# Patient Record
Sex: MALE | Race: WHITE | NOT HISPANIC OR LATINO | Employment: OTHER | URBAN - METROPOLITAN AREA
[De-identification: names, ages, dates, MRNs, and addresses within clinical notes are randomized per-mention and may not be internally consistent; named-entity substitution may affect disease eponyms.]

---

## 2023-11-28 ENCOUNTER — OFFICE VISIT (OUTPATIENT)
Dept: NEUROLOGY | Facility: HOSPITAL | Age: 68
End: 2023-11-28
Payer: MEDICARE

## 2023-11-28 VITALS
SYSTOLIC BLOOD PRESSURE: 138 MMHG | RESPIRATION RATE: 18 BRPM | DIASTOLIC BLOOD PRESSURE: 91 MMHG | TEMPERATURE: 97.1 F | WEIGHT: 231 LBS | HEIGHT: 71 IN | HEART RATE: 79 BPM | BODY MASS INDEX: 32.34 KG/M2

## 2023-11-28 DIAGNOSIS — G20.C PARKINSONISM, UNSPECIFIED PARKINSONISM TYPE (MULTI): Primary | ICD-10-CM

## 2023-11-28 DIAGNOSIS — R13.10 DYSPHAGIA, UNSPECIFIED TYPE: ICD-10-CM

## 2023-11-28 DIAGNOSIS — F48.2 PSEUDOBULBAR AFFECT: ICD-10-CM

## 2023-11-28 PROCEDURE — 1126F AMNT PAIN NOTED NONE PRSNT: CPT | Performed by: PSYCHIATRY & NEUROLOGY

## 2023-11-28 PROCEDURE — 1036F TOBACCO NON-USER: CPT | Performed by: PSYCHIATRY & NEUROLOGY

## 2023-11-28 PROCEDURE — 99205 OFFICE O/P NEW HI 60 MIN: CPT | Performed by: PSYCHIATRY & NEUROLOGY

## 2023-11-28 PROCEDURE — 99215 OFFICE O/P EST HI 40 MIN: CPT | Performed by: PSYCHIATRY & NEUROLOGY

## 2023-11-28 PROCEDURE — 1159F MED LIST DOCD IN RCRD: CPT | Performed by: PSYCHIATRY & NEUROLOGY

## 2023-11-28 PROCEDURE — 1160F RVW MEDS BY RX/DR IN RCRD: CPT | Performed by: PSYCHIATRY & NEUROLOGY

## 2023-11-28 RX ORDER — MODAFINIL 100 MG/1
100 TABLET ORAL DAILY
COMMUNITY
Start: 2023-11-02

## 2023-11-28 RX ORDER — MELATONIN 5 MG
CAPSULE ORAL
COMMUNITY
Start: 2021-07-02

## 2023-11-28 RX ORDER — CALCIUM CARB/VITAMIN D3/VIT K1 500-500-40
5000 TABLET,CHEWABLE ORAL
COMMUNITY

## 2023-11-28 RX ORDER — CARBIDOPA AND LEVODOPA 25; 100 MG/1; MG/1
2 TABLET ORAL 3 TIMES DAILY
Qty: 90 TABLET | Refills: 2 | Status: SHIPPED | OUTPATIENT
Start: 2023-11-28 | End: 2024-01-04 | Stop reason: SDUPTHER

## 2023-11-28 RX ORDER — VITAMIN B COMPLEX
CAPSULE ORAL
COMMUNITY

## 2023-11-28 RX ORDER — OMEGA-3-ACID ETHYL ESTERS 1 G/1
1 CAPSULE, LIQUID FILLED ORAL 2 TIMES DAILY
COMMUNITY
End: 2023-11-28 | Stop reason: ENTERED-IN-ERROR

## 2023-11-28 ASSESSMENT — UNIFIED PARKINSONS DISEASE RATING SCALE (UPDRS)
POSTURAL_TREMOR_LEFTHAND: 0
TOETAPPING_LEFT: 2
PRONATION_SUPINATION_LEFT: 2
RIGIDITY_LUE: 0
FINGER_TAPPING_RIGHT: 2
CONSTANCY_TREMOR_ATREST: 0
LEG_AGILITY_RIGHT: 2
RIGIDITY_RLE: 1
SPEECH: 3
TOETAPPING_RIGHT: 3
RIGIDITY_NECK: 0
FREEZING_GAIT: 1
LEVODOPA: NO
RIGIDITY_RUE: 1
SPONTANEITY_OF_MOVEMENT: 2
AMPLITUDE_RUE: 0
RIGIDITY_LLE: 0
POSTURAL_TREMOR_RIGHTHAND: 0
POSTURE: 1
LEG_AGILITY_LEFT: 1
PRONATION_SUPINATION_RIGHT: 2
FACIAL_EXPRESSION: 2
KINETIC_TREMOR_RIGHTHAND: 0
PARKINSONS_MEDS: NO
HANDMOVEMENTS_RIGHT: 2
POSTURAL_STABILITY: 3
AMPLITUDE_RLE: 0
AMPLITUDE_LUE: 0
KINETIC_TREMOR_LEFTHAND: 0
AMPLITUDE_LLE: 0
CHAIR_RISING_SCALE: 1
FINGER_TAPPING_LEFT: 2
GAIT: 2

## 2023-11-28 ASSESSMENT — MONTREAL COGNITIVE ASSESSMENT (MOCA)
5. MEMORY TRIALS: 0
WHAT IS THE TOTAL SCORE (OUT OF 30): 22
11. FOR EACH PAIR OF WORDS, WHAT CATEGORY DO THEY BELONG TO (OUT OF 2): 2
WHAT LEVEL OF EDUCATION WAS ATTAINED: 0
7. [VIGILENCE] TAP WHEN HEARING DESIGNATED LETTER: 0
9. REPEAT EACH SENTENCE: 2
VISUOSPATIAL/EXECUTIVE SUBSCORE: 3
8. SERIAL SUBTRACTION OF 7S: 3
12. MEMORY INDEX SCORE: 2
4. NAME EACH OF THE THREE ANIMALS SHOWN: 3
10. [FLUENCY] NAME WORDS STARTING WITH DESIGNATED LETTER: 0
6. READ LIST OF DIGITS [FORWARD/BACKWARD]: 2
13. ORIENTATION SUBSCORE: 5

## 2023-11-28 ASSESSMENT — ENCOUNTER SYMPTOMS
OCCASIONAL FEELINGS OF UNSTEADINESS: 1
DEPRESSION: 0
LOSS OF SENSATION IN FEET: 0

## 2023-11-28 ASSESSMENT — PAIN SCALES - GENERAL: PAINLEVEL: 0-NO PAIN
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
## 2023-11-28 NOTE — PATIENT INSTRUCTIONS
"It was nice to meet you today,   You have parkinsonism on your exam. In addition your eyes are moving a little slower in the vertical plane. I will keep an eye on this. The main differential here is Parkinson's disease as well as Progressive Supranuclear Palsy - also called PSP, however you have not tried any medications, so for now we are saying \"parkinsonism\".     Plan:  Start Carbidopa/levodopa 25/100 - start one tab three times a day, take 30 mins after breakfast, lunch and dinner. Watch for nausea, lightheadedness when standing, confusion and hallucinations as well as dyskinesias. Follow the instructions on the pill bottle to increase the dose gradually. If there are side effects back off.   Physical therapy for gait and balance  Barium swallow test and speech therapy.   RTC in 3 months   "

## 2024-01-04 ENCOUNTER — TELEPHONE (OUTPATIENT)
Dept: NEUROLOGY | Facility: CLINIC | Age: 69
End: 2024-01-04
Payer: MEDICARE

## 2024-01-04 DIAGNOSIS — G20.C PARKINSONISM, UNSPECIFIED PARKINSONISM TYPE (MULTI): ICD-10-CM

## 2024-01-04 RX ORDER — CARBIDOPA AND LEVODOPA 25; 100 MG/1; MG/1
2 TABLET ORAL 3 TIMES DAILY
Qty: 540 TABLET | Refills: 3 | Status: SHIPPED | OUTPATIENT
Start: 2024-01-04 | End: 2025-01-03

## 2024-01-04 NOTE — TELEPHONE ENCOUNTER
Patient spouse requesting a refill of Carbidopa-Levodopa  mg sent to [facility] pharmacy.

## 2024-03-07 ENCOUNTER — OFFICE VISIT (OUTPATIENT)
Dept: NEUROLOGY | Facility: HOSPITAL | Age: 69
End: 2024-03-07
Payer: MEDICARE

## 2024-03-07 VITALS
BODY MASS INDEX: 32.48 KG/M2 | WEIGHT: 232 LBS | HEIGHT: 71 IN | DIASTOLIC BLOOD PRESSURE: 80 MMHG | HEART RATE: 79 BPM | SYSTOLIC BLOOD PRESSURE: 117 MMHG

## 2024-03-07 DIAGNOSIS — G23.1 PSP (PROGRESSIVE SUPRANUCLEAR PALSY) (MULTI): Primary | ICD-10-CM

## 2024-03-07 DIAGNOSIS — R56.9 SEIZURE (MULTI): ICD-10-CM

## 2024-03-07 DIAGNOSIS — F48.2 PBA (PSEUDOBULBAR AFFECT): ICD-10-CM

## 2024-03-07 PROCEDURE — 99214 OFFICE O/P EST MOD 30 MIN: CPT | Mod: GC | Performed by: PSYCHIATRY & NEUROLOGY

## 2024-03-07 PROCEDURE — 99214 OFFICE O/P EST MOD 30 MIN: CPT | Performed by: PSYCHIATRY & NEUROLOGY

## 2024-03-07 PROCEDURE — 1036F TOBACCO NON-USER: CPT | Performed by: PSYCHIATRY & NEUROLOGY

## 2024-03-07 PROCEDURE — 1126F AMNT PAIN NOTED NONE PRSNT: CPT | Performed by: PSYCHIATRY & NEUROLOGY

## 2024-03-07 PROCEDURE — 1160F RVW MEDS BY RX/DR IN RCRD: CPT | Performed by: PSYCHIATRY & NEUROLOGY

## 2024-03-07 PROCEDURE — 1159F MED LIST DOCD IN RCRD: CPT | Performed by: PSYCHIATRY & NEUROLOGY

## 2024-03-07 RX ORDER — CITALOPRAM 20 MG/1
TABLET, FILM COATED ORAL
Qty: 90 TABLET | Refills: 3 | Status: SHIPPED | OUTPATIENT
Start: 2024-03-07

## 2024-03-07 NOTE — PATIENT INSTRUCTIONS
It was pleasure to visit you.  You can taper off carbidopa levodopa  Take one tablet three times a day for a week then discontinue it  For two episodes of staring we ordered EEG.  We put a referral for speech therapy
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
## 2024-03-14 ENCOUNTER — APPOINTMENT (OUTPATIENT)
Dept: NEUROLOGY | Facility: HOSPITAL | Age: 69
End: 2024-03-14
Payer: MEDICARE

## 2024-04-01 ENCOUNTER — TELEPHONE (OUTPATIENT)
Dept: NEUROLOGY | Facility: CLINIC | Age: 69
End: 2024-04-01
Payer: MEDICARE

## 2024-04-01 NOTE — TELEPHONE ENCOUNTER
Speech Therapy called from [facility] stating that order has to have diagnosis of Dysphagia on it for Insurance purposes to pay not just Parkinson's (R13.12 IS CODE)

## 2024-04-02 DIAGNOSIS — G20.A1 PARKINSON'S DISEASE WITHOUT DYSKINESIA OR FLUCTUATING MANIFESTATIONS (MULTI): Primary | ICD-10-CM

## 2024-04-10 ENCOUNTER — TELEPHONE (OUTPATIENT)
Dept: NEUROLOGY | Facility: CLINIC | Age: 69
End: 2024-04-10
Payer: MEDICARE

## 2024-04-10 NOTE — TELEPHONE ENCOUNTER
Wife called stated patient has gotten worse since the discontinued Carbidopa/Levadopa ... Patient fell and broke wrist wife would like to know can he restart medication

## 2024-04-11 DIAGNOSIS — R13.10 DYSPHAGIA, UNSPECIFIED TYPE: ICD-10-CM

## 2024-04-11 DIAGNOSIS — G20.C PARKINSONISM, UNSPECIFIED PARKINSONISM TYPE (MULTI): Primary | ICD-10-CM

## 2024-04-11 NOTE — TELEPHONE ENCOUNTER
Spoke to the spouse and passed along information from Dr Von macedo to restart C/L. Discussed instructions, dose times, constipation, diarrhea and avoiding protein at dose times. Pt having wrist surgery next week.

## 2024-06-10 ENCOUNTER — TELEPHONE (OUTPATIENT)
Dept: NEUROLOGY | Facility: CLINIC | Age: 69
End: 2024-06-10
Payer: MEDICARE

## 2024-06-10 NOTE — TELEPHONE ENCOUNTER
Looks like Dr. Connelly ordered EEG at last visit in March, I do not see it was done? Please have them complete before follow up. Thanks!

## 2024-06-10 NOTE — TELEPHONE ENCOUNTER
"I spoke to the spouse. Pt \"zones out\" on occasion and seems near to LOC. Spouse is retired RN - does not think its a seizure. He remembers the entire event which does not last long and complains of feeling suddenly dizzy. Bps typically 120s/80s but she does not take them often. Advised to start a BP log with orthostatics in Morning and Evening. Especially right after these episodes. Advised to go to ED for any LOC or fall where he hits his head. He has fallen 3 times since broken wrist w plate repair in early April. He his scheduled for plate removal in early July. No other meds changes; has been to PCP at [facility] and recent labs are all good. She will complete BP log and advised her to upload it to us in EPIC. He has had no other recent med changes - his thinking is clear and no changes with ambulation or movements. He goes to PT twice weekly for wrist. He is scheduled to see BELINDA Smith in July. Declines NP visit livan Hayes at this time. They will do BP log and call back to schedule livan Hayes if they feel he needs to be seen sooner  "

## 2024-06-10 NOTE — TELEPHONE ENCOUNTER
"Ruth (wife & RN) called. Aleksandar had a significant fall requiring wrist surgery to be done at [facility]. Since then he has had a few falls. \"Spells where he zones out & feels dizzy\" . She encourages fluid intake but still problematic. She is wondering if there is anything that can be done about these spells. She took his BP once and was found to be a little low. Please call her at [phone]  "

## 2024-06-12 ENCOUNTER — HOSPITAL ENCOUNTER (OUTPATIENT)
Dept: NEUROLOGY | Facility: HOSPITAL | Age: 69
Discharge: HOME | End: 2024-06-12
Payer: MEDICARE

## 2024-06-12 DIAGNOSIS — R56.9 SEIZURE (MULTI): ICD-10-CM

## 2024-06-12 PROCEDURE — 95816 EEG AWAKE AND DROWSY: CPT

## 2024-06-17 ENCOUNTER — TELEPHONE (OUTPATIENT)
Dept: NEUROLOGY | Facility: CLINIC | Age: 69
End: 2024-06-17
Payer: MEDICARE

## 2024-06-17 NOTE — TELEPHONE ENCOUNTER
----- Message from Sonia Connelly MD sent at 6/14/2024  2:15 PM EDT -----  Plz let pt know EEG was normal,no evidence of epileptic activity

## 2024-06-17 NOTE — TELEPHONE ENCOUNTER
Called Back- No Answer, Left detailed message passing along the information from Dr Connelly regarding EEG was normal.

## 2024-07-23 ENCOUNTER — OFFICE VISIT (OUTPATIENT)
Dept: NEUROLOGY | Facility: HOSPITAL | Age: 69
End: 2024-07-23
Payer: MEDICARE

## 2024-07-23 VITALS
HEART RATE: 75 BPM | SYSTOLIC BLOOD PRESSURE: 114 MMHG | DIASTOLIC BLOOD PRESSURE: 76 MMHG | WEIGHT: 232.5 LBS | BODY MASS INDEX: 32.55 KG/M2 | HEIGHT: 71 IN | TEMPERATURE: 97.2 F | RESPIRATION RATE: 18 BRPM

## 2024-07-23 DIAGNOSIS — F48.2 PSEUDOBULBAR AFFECT: ICD-10-CM

## 2024-07-23 DIAGNOSIS — R55 SYNCOPE, UNSPECIFIED SYNCOPE TYPE: ICD-10-CM

## 2024-07-23 DIAGNOSIS — G20.C PARKINSONISM, UNSPECIFIED PARKINSONISM TYPE (MULTI): Primary | ICD-10-CM

## 2024-07-23 PROCEDURE — 1160F RVW MEDS BY RX/DR IN RCRD: CPT | Performed by: PSYCHIATRY & NEUROLOGY

## 2024-07-23 PROCEDURE — 99214 OFFICE O/P EST MOD 30 MIN: CPT | Performed by: PSYCHIATRY & NEUROLOGY

## 2024-07-23 PROCEDURE — 1126F AMNT PAIN NOTED NONE PRSNT: CPT | Performed by: PSYCHIATRY & NEUROLOGY

## 2024-07-23 PROCEDURE — 1159F MED LIST DOCD IN RCRD: CPT | Performed by: PSYCHIATRY & NEUROLOGY

## 2024-07-23 PROCEDURE — 99214 OFFICE O/P EST MOD 30 MIN: CPT | Mod: GC | Performed by: PSYCHIATRY & NEUROLOGY

## 2024-07-23 PROCEDURE — 1036F TOBACCO NON-USER: CPT | Performed by: PSYCHIATRY & NEUROLOGY

## 2024-07-23 PROCEDURE — 3008F BODY MASS INDEX DOCD: CPT | Performed by: PSYCHIATRY & NEUROLOGY

## 2024-07-23 ASSESSMENT — UNIFIED PARKINSONS DISEASE RATING SCALE (UPDRS)
HOEHN_YAHR: 4
RIGIDITY_RUE: 0
KINETIC_TREMOR_LEFTHAND: 0
AMPLITUDE_LUE: 0
CLINICAL_STATE: ON
PARKINSONS_MEDS: YES
DYSKINESIAS_PRESENT: NO
RIGIDITY_LLE: 0
RIGIDITY_NECK: 0
PRONATION_SUPINATION_LEFT: 1
PRONATION_SUPINATION_RIGHT: 1
LEG_AGILITY_LEFT: 1
CHAIR_RISING_SCALE: 4
CONSTANCY_TREMOR_ATREST: 0
POSTURAL_TREMOR_RIGHTHAND: 0
FINGER_TAPPING_LEFT: 3
SPONTANEITY_OF_MOVEMENT: 1
SPEECH: 0
KINETIC_TREMOR_RIGHTHAND: 0
AMPLITUDE_LIP_JAW: 0
AMPLITUDE_RUE: 0
LEVODOPA: YES
FACIAL_EXPRESSION: 4
POSTURAL_TREMOR_LEFTHAND: 0
FREEZING_GAIT: 1
RIGIDITY_RLE: 0
RIGIDITY_LUE: 0
LEG_AGILITY_RIGHT: 1
HANDMOVEMENTS_RIGHT: 2
GAIT: 3
TOETAPPING_LEFT: 1
AMPLITUDE_LLE: 0
FINGER_TAPPING_RIGHT: 2
TOETAPPING_RIGHT: 1
POSTURE: 1
AMPLITUDE_RLE: 0

## 2024-07-23 ASSESSMENT — PAIN SCALES - GENERAL: PAINLEVEL: 0-NO PAIN
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
## 2024-11-26 ENCOUNTER — OFFICE VISIT (OUTPATIENT)
Dept: NEUROLOGY | Facility: HOSPITAL | Age: 69
End: 2024-11-26
Payer: MEDICARE

## 2024-11-26 VITALS
HEART RATE: 80 BPM | HEIGHT: 71 IN | DIASTOLIC BLOOD PRESSURE: 76 MMHG | BODY MASS INDEX: 32.43 KG/M2 | SYSTOLIC BLOOD PRESSURE: 116 MMHG

## 2024-11-26 DIAGNOSIS — F48.2 PBA (PSEUDOBULBAR AFFECT): Primary | ICD-10-CM

## 2024-11-26 DIAGNOSIS — G23.1 PSP (PROGRESSIVE SUPRANUCLEAR PALSY) (MULTI): ICD-10-CM

## 2024-11-26 DIAGNOSIS — G20.C PARKINSONISM, UNSPECIFIED PARKINSONISM TYPE (MULTI): ICD-10-CM

## 2024-11-26 DIAGNOSIS — R13.10 DYSPHAGIA, UNSPECIFIED TYPE: ICD-10-CM

## 2024-11-26 PROCEDURE — 99214 OFFICE O/P EST MOD 30 MIN: CPT | Performed by: PSYCHIATRY & NEUROLOGY

## 2024-11-26 PROCEDURE — 1036F TOBACCO NON-USER: CPT | Performed by: PSYCHIATRY & NEUROLOGY

## 2024-11-26 PROCEDURE — G2211 COMPLEX E/M VISIT ADD ON: HCPCS | Performed by: PSYCHIATRY & NEUROLOGY

## 2024-11-26 PROCEDURE — 1159F MED LIST DOCD IN RCRD: CPT | Performed by: PSYCHIATRY & NEUROLOGY

## 2024-11-26 PROCEDURE — 1160F RVW MEDS BY RX/DR IN RCRD: CPT | Performed by: PSYCHIATRY & NEUROLOGY

## 2024-11-26 RX ORDER — DEXTROMETHORPHAN HYDROBROMIDE AND QUINIDINE SULFATE 20; 10 MG/1; MG/1
1 CAPSULE, GELATIN COATED ORAL 2 TIMES DAILY
Qty: 60 CAPSULE | Refills: 11 | Status: SHIPPED | OUTPATIENT
Start: 2024-11-26 | End: 2025-11-26

## 2024-11-26 ASSESSMENT — UNIFIED PARKINSONS DISEASE RATING SCALE (UPDRS)
FREEZING_GAIT: 0
TOETAPPING_RIGHT: 1
FACIAL_EXPRESSION: 3
FINGER_TAPPING_RIGHT: 1
TOTAL_SCORE: 36
POSTURAL_TREMOR_LEFTHAND: 0
PRONATION_SUPINATION_RIGHT: 1
POSTURAL_TREMOR_RIGHTHAND: 0
KINETIC_TREMOR_RIGHTHAND: 0
DYSKINESIAS_PRESENT: NO
TOETAPPING_LEFT: 2
RIGIDITY_RUE: 0
AMPLITUDE_LUE: 0
FINGER_TAPPING_LEFT: 2
CLINICAL_STATE: ON
HANDMOVEMENTS_RIGHT: 2
RIGIDITY_RLE: 2
LEVODOPA: YES
AMPLITUDE_LLE: 0
SPEECH: 2
PRONATION_SUPINATION_LEFT: 2
GAIT: 2
LEG_AGILITY_RIGHT: 1
RIGIDITY_LUE: 1
RIGIDITY_LLE: 2
SPONTANEITY_OF_MOVEMENT: 1
PARKINSONS_MEDS: YES
AMPLITUDE_LIP_JAW: 0
LEG_AGILITY_LEFT: 1
KINETIC_TREMOR_LEFTHAND: 0
CHAIR_RISING_SCALE: 3
POSTURE: 1
AMPLITUDE_RLE: 0
HOEHN_YAHR: 4
AMPLITUDE_RUE: 0
POSTURAL_STABILITY: 2
CONSTANCY_TREMOR_ATREST: 0
RIGIDITY_NECK: 1
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
## 2024-11-26 NOTE — PATIENT INSTRUCTIONS
It was nice to see you today     -Continue Sinemet 25/100 2 tablets 3 times a day. (Symptoms worsened when tapered off, so at least some mild levodopa responsiveness.   - continue citalopram   - let;s try Nuedexta for the pseudobulbar affect.   Let us know if this is expensive  - RTC in 4-5 months.  - order for PT, OT and ST

## 2025-01-16 ENCOUNTER — TELEPHONE (OUTPATIENT)
Dept: NEUROLOGY | Facility: CLINIC | Age: 70
End: 2025-01-16
Payer: MEDICARE

## 2025-01-16 NOTE — TELEPHONE ENCOUNTER
Ruth ( wife/ POA) called. Aleksandar has had 2-3 spells while seated. They spell lasts a few seconds. He flapps his hands and zones out .  She's not sure what to do with this situation or if it's relevant to his PSP or able to be treated. Please call  [phone]

## 2025-01-17 ENCOUNTER — TELEPHONE (OUTPATIENT)
Dept: NEUROLOGY | Facility: HOSPITAL | Age: 70
End: 2025-01-17
Payer: MEDICARE

## 2025-01-17 NOTE — TELEPHONE ENCOUNTER
Spoke with wife who reports 2 episodes of unusual behavior (glazed eyes, flapping hands lasting less than 20 seconds with no confusion after). Patient responsive to his name right after episode. One episode on Sunday 12th and another today. Wife concerned that it might be a seizure but reports previous episodes of zoning out for which he had EEG that was unrevealing.    Recommended that patient to go to ED if he gets increased frequency of these events or if he gets whole body shaking episodes

## 2025-02-03 ENCOUNTER — TELEPHONE (OUTPATIENT)
Dept: NEUROLOGY | Facility: CLINIC | Age: 70
End: 2025-02-03
Payer: MEDICARE

## 2025-02-03 DIAGNOSIS — G20.C PARKINSONISM, UNSPECIFIED PARKINSONISM TYPE (MULTI): ICD-10-CM

## 2025-02-03 RX ORDER — CARBIDOPA AND LEVODOPA 25; 100 MG/1; MG/1
2 TABLET ORAL 3 TIMES DAILY
Qty: 540 TABLET | Refills: 3 | Status: SHIPPED | OUTPATIENT
Start: 2025-02-03 | End: 2026-02-03

## 2025-02-03 NOTE — TELEPHONE ENCOUNTER
Sharon called this morning. Aleksandar has had 3 spells since 1/17/25 when you last spoke with her. She remains concerned about treatment for these spells. She also mentioned that he is urinating more frequently and bathroom trips are longer than usual. I reviewed the last phone call with her and advised a trip to the ER. They would be able to test for acute illness/ UTI ect. She was inquiring about an MRI/ medication to help with the spells. I again advised her to take him to ER for this type of spell. She also make an appt with Camilo Hayes CNP for 3/18 ( her preferred time/ date/ location. I did let her know that if she didn't want the ER visit she should at least reach out to PCP for UA/ Labs to rule out infection/ acute illness. Please call back to reaffirm our previous instructions.

## 2025-02-04 NOTE — PROGRESS NOTES
Spoke to patient regarding increased frequency of zoning out episodes lasting less than 30 seconds. Wife wanted patient to be seen sooner at outpatient office. Explained that our outpatient movement disorder clinic does not have the tools needed to evaluate patient's current episodes. Wife indicated that that she would take him to the ER if episodes were to continue to happen.    Refill for Sinemet was provided.

## 2025-03-13 ENCOUNTER — OFFICE VISIT (OUTPATIENT)
Dept: NEUROLOGY | Facility: HOSPITAL | Age: 70
End: 2025-03-13
Payer: MEDICARE

## 2025-03-13 VITALS
TEMPERATURE: 96.9 F | SYSTOLIC BLOOD PRESSURE: 94 MMHG | HEART RATE: 77 BPM | HEIGHT: 71 IN | RESPIRATION RATE: 18 BRPM | WEIGHT: 237 LBS | DIASTOLIC BLOOD PRESSURE: 56 MMHG | BODY MASS INDEX: 33.18 KG/M2

## 2025-03-13 DIAGNOSIS — G23.1 PSP (PROGRESSIVE SUPRANUCLEAR PALSY) (MULTI): Primary | ICD-10-CM

## 2025-03-13 DIAGNOSIS — F48.2 PSEUDOBULBAR AFFECT: ICD-10-CM

## 2025-03-13 DIAGNOSIS — R53.82 CHRONIC FATIGUE: ICD-10-CM

## 2025-03-13 DIAGNOSIS — R40.4 EPISODE OF UNRESPONSIVENESS: ICD-10-CM

## 2025-03-13 PROCEDURE — 1036F TOBACCO NON-USER: CPT | Performed by: NURSE PRACTITIONER

## 2025-03-13 PROCEDURE — 1126F AMNT PAIN NOTED NONE PRSNT: CPT | Performed by: NURSE PRACTITIONER

## 2025-03-13 PROCEDURE — 99215 OFFICE O/P EST HI 40 MIN: CPT | Performed by: NURSE PRACTITIONER

## 2025-03-13 PROCEDURE — 1159F MED LIST DOCD IN RCRD: CPT | Performed by: NURSE PRACTITIONER

## 2025-03-13 PROCEDURE — 1160F RVW MEDS BY RX/DR IN RCRD: CPT | Performed by: NURSE PRACTITIONER

## 2025-03-13 PROCEDURE — 3008F BODY MASS INDEX DOCD: CPT | Performed by: NURSE PRACTITIONER

## 2025-03-13 RX ORDER — CARBIDOPA AND LEVODOPA 25; 100 MG/1; MG/1
2 TABLET ORAL 4 TIMES DAILY
Qty: 720 TABLET | Refills: 3 | Status: SHIPPED | OUTPATIENT
Start: 2025-03-13 | End: 2026-03-13

## 2025-03-13 ASSESSMENT — UNIFIED PARKINSONS DISEASE RATING SCALE (UPDRS)
AMPLITUDE_LIP_JAW: 0
LEG_AGILITY_LEFT: 3
POSTURE: 2
KINETIC_TREMOR_LEFTHAND: 0
CHAIR_RISING_SCALE: 2
RIGIDITY_RLE: 0
RIGIDITY_LUE: 0
CLINICAL_STATE: ON
RIGIDITY_NECK: 0
RIGIDITY_LLE: 0
TOETAPPING_LEFT: 2
PARKINSONS_MEDS: YES
PRONATION_SUPINATION_LEFT: 3
TOETAPPING_RIGHT: 2
GAIT: 3
FINGER_TAPPING_RIGHT: 1
AMPLITUDE_LUE: 0
SPEECH: 2
FINGER_TAPPING_LEFT: 2
PRONATION_SUPINATION_RIGHT: 1
FREEZING_GAIT: 0
AMPLITUDE_LLE: 0
FACIAL_EXPRESSION: 2
AMPLITUDE_RLE: 0
RIGIDITY_RUE: 1
KINETIC_TREMOR_RIGHTHAND: 0
SPONTANEITY_OF_MOVEMENT: 3
LEVODOPA: YES
CONSTANCY_TREMOR_ATREST: 0
LEG_AGILITY_RIGHT: 3
POSTURAL_STABILITY: 0
POSTURAL_TREMOR_RIGHTHAND: 0
AMPLITUDE_RUE: 0
HANDMOVEMENTS_RIGHT: 2
TOTAL_SCORE: 37
POSTURAL_TREMOR_LEFTHAND: 0

## 2025-03-13 ASSESSMENT — ENCOUNTER SYMPTOMS
OCCASIONAL FEELINGS OF UNSTEADINESS: 1
DEPRESSION: 0
LOSS OF SENSATION IN FEET: 0

## 2025-03-13 ASSESSMENT — PAIN SCALES - GENERAL: PAINLEVEL_OUTOF10: 0-NO PAIN

## 2025-03-13 NOTE — PATIENT INSTRUCTIONS
#MRI brain--schedule 2 weeks out, obtain if no improvement in spells w/ increase in meds    Please call radiology to schedule  [phone]    #Increase Carbidopa-levodopa 25/100mg to 2 tabs 4 times a day every 4 hrs [id number]    Side effects include but are not limited to: sleepiness, nausea, constipation, dizziness, hallucinations, or involuntary wiggling movements. If you experience any side effects please call our clinic.    #Labs ordered since you cannot get in with your PCP    #Let me know if blood pressure, because we will monitor BPs again    #Agree with restarting ST    Let me know if ST wants to repeat  modified barium swallow study to assess swallowing--I offered to order now, goal is to prevent choking/aspiration pneumonia.     #Keep apt as scheduled with Dr. Tory Hayes, NP-C  Adult/Gerontological Nurse Practitioner   [facility], Department of Neurology  [facility] [place]  [facility]  [address] [place] [address]  Phone: [phone]  Fax: [phone]

## 2025-03-13 NOTE — PROGRESS NOTES
"Subjective     Josh Lockwood is a 69 y.o. year old male who presents with No chief complaint on file., here for follow up visit.    HPI  Last visit 11/26/24 with Dr. Connelly for \"parkinsonism and slow vertical saccades with pseudobulbar affect has been suspected for progressive supranuclear palsy\"  Plan:  -Continue Sinemet 25/100 2 tablets 3 times a day. (Symptoms worsened when tapered off, so at least some mild levodopa responsiveness.   - try Nuedexta for PBA  - OT, ST, PT  - RTC in 4 months.     Comes w/ wife and daughter.     Nudexta for inappropriate laughing. Does not have as many episodes of crying anymore. This is too expensive at $800/M.     Started calling in Jan for short reoccurring episodes \"zones out\", first time had movement of hands (only time flailing his hands slightly), rolls eyes up but 20-30sec, wife feels she can get him out of it. Not starting and not responding, eyes looking up.   If he gets into a laughing jag he can have one but usually when watching TV.   2 a week since Jan 12th.   When he comes out of the spells he is alert and with it. He tells her he had a spell when she is not there, once felt dizzy but not usually  He had an EEG in June 2024 (ordered 3/2024) for having 1-2 of these spells prior to then but are increasing.   Occurs toward the end of the or when due for a dose of Sinemet wife notes  Not confused coming out of it  He cannot hear her but when he comes out of it he can hear wife yelling.     Fatigued, can sleep 16hrs/day.     Laughing episodes are severe, like a \"jag\" where he cannot breath because he is laughing so hard, not as often during the day but worse as the day goes on, worse at hs.       MOTOR SYMPTOMS      +/-                            Comments  Motor sx overall     Tremor -    Rigidity -    Bradykinesia +    Balance/gait + Cane or walker at home    Hx hip frx 2022 has impacted gait since   FOG + More noticible and trips him up   Falls + 1-2x/month not bad "   PT + [facility] ending now   Exercise +      NON MOTOR SYMPTOMS       +/-                               Comments  Orthostatic lightheadedness  + HHC did OH BPs and were normal though fluctuates, low BP and high BP and frequently checked sitting to standing    Wife forces fluids and not enough   Cognitive + Wife denies concerns, feels rarely is he forgetful    Hx: Changes noted in 2021   Insomnia -    RBD -    Depression - Citalopram   Anxiety -    Sialorrhea -    Hypophonia +    Dysphagia + Worse, has seen ST and did well w/ techniques but now worsened with liquids--she has req to do ST again   Constipation -    Urinary dysfunction + Takes a long time to urinate but he feels wife is too close to the bathroom        Social  Lives with: wife  Help with ADLs: complete assistance         [facility] Meds  Med Dose Time   Carbidopa-levodopa 25/100mg   2 tabs 3 times a day 8-3-10                   Latency     Wearing off Close to dose wife notices he is more difficult to manage with laughing    Side effects     Dyskinesia     Hallucinations     Impulse control     Sudden sleep     Other       Prior medications:    Pertinent testing:  EEG 6/13/24: normal routine EEG without epileptiform discharges or lateralizing signs.    MOCA 22/30 ,on 11/2023.                   Current Outpatient Medications:     b complex vitamins capsule, Take by mouth., Disp: , Rfl:     carbidopa-levodopa (Sinemet)  mg tablet, Take 2 tablets by mouth 4 times a day., Disp: 720 tablet, Rfl: 3    cholecalciferol (Vitamin D-3) 400 unit capsule, Take 5,000 Units by mouth., Disp: , Rfl:     citalopram (CeleXA) 20 mg tablet, Take half tablet for one week then take one tablet daily in the morning, Disp: 90 tablet, Rfl: 3    dextromethorphan-quinidine (Nuedexta) 20-10 mg capsule, Take 1 capsule by mouth 2 times a day. Week 1: take one tab a day, from week 2 onwards: one tab twice a day., Disp: 60 capsule, Rfl: 11    melatonin 5 mg capsule, Take  "by mouth., Disp: , Rfl:     modafinil (Provigil) 100 mg tablet, Take 1 tablet (100 mg) by mouth once daily., Disp: , Rfl:        Objective   Vitals:    03/13/25 1048   BP: 94/56   Pulse: 77   Resp: 18   Temp: 36.1 °C (96.9 °F)   Weight: 108 kg (237 lb)   Height: 1.803 m (5' 11\")                 Physical Exam    General:  Well developed well-nourished male in no acute distress with good grooming.  Mental Status:  The patient is awake, alert, slow to respond. Attention span and concentration is within normal limits. Some laughing but not excessive or inappropriate.   Language:  Speech is hypophonic.  Cranial Nerves:  Pupils are equal, round, reactive to light.  Extraocular muscles are intact but slow vertical gazes.  Cranial nerve 5 and 7 are symmetric bilaterally.  Hearing is intact to voice. Palate elevates symmetrically.  Tongue is midline.  Shoulder shrug is intact.   Motor Exam:  Shows symmetric strength in the upper and lower extremities bilaterally both proximally and distally with normal tone and bulk. Muscle strength 5/5 throughout.   Cerebellar Exam:  Shows no dysmetria or truncal ataxia.  Gait and Station: Stooped posture, slow moving with cane, short steps/narrow base.       MDS UPDRS 1st Score: Motor Examination  Is the patient on medication for treating the symptoms of Parkinson's Disease?: Yes  Patients receiving medication for treating the symptoms of Parkinson's Disease, diana the patient's clinical state.: On  Is the patient on Levodopa?: Yes  Minutes since last Levodopa dose: 96min  Speech: 2  Facial Expression: 2  Rigidty Neck: 0  Rigidty RUE: 1  Rigidity - LUE: 0  Rigidity RLE: 0  Rigidity LLE: 0  Finger Tapping Right Hand: 1  Finger Tapping Left Hand: 2  Hand Movements- Right Hand: 2  Hand Movements- Left Hand: 3 (hx L hand/wrist surgery)  Pronatiaon-Supination Movments - Right Hand: 1  Pronatiaon-Supination Movments Left Hand: 3  Toe Tapping Right Foot: 2  Toe Tapping - Left Foot: 2  Leg Agility " - Right Leg: 3  Leg Agility - Left leg: 3  Arising from Chair: 2  Gait: 3  Freezing of Gait: 0  Postural Stability: 0 (not tested)  Posture: 2  Global Spontanteity of Movment ( Body Bradykinesia): 3  Postural Tremor - Right Hand: 0  Postural Tremor - Left hand: 0  Kinetic Tremor - Right hand: 0  Kinetic Tremor - Left hand: 0  Rest Tremor Amplitude - RUE: 0  Rest Tremor Amplitude - LUE: 0  Rest Tremor Amplitude - RLE: 0  Rest Tremor Amplitude - LLE: 0  Rest Tremor Amplitude - Lip/Jaw: 0  Constancy of Rest Tremor: 0  MDS UPDRS Total Score: 37        Assessment/Plan   Mr. Josh Lockwood is a 69 y.o. M with parksinsonism, likely PSP. Sx noted in 2020, after he had COVID-19, and currently clinical picture of parkinsonism most suggestive of PSP (abnormal verticle saccades). Motor symptoms are improvement with Sinemet, worsened when weaned, he goes a long time between doses and episodes noted below often occur when wife knows he is due for another dose, will increase as is tolerating.     pseudobulbar affect (inappropriate and excessive laughing), Nudexta too expensive, will discuss other options with Dr. Connelly.     Dysphagia: offered MBSS, wife prefers to be re-assessed by ST and let me know if they would like an order    Episodes of change in LOC: eyes roll back in the head, hand flailing only once, lasting 20-30 sec. Started around March of 2024 as he had an EEG for this in June, was negative. Now increasing, occurring about 2x/week, does not appear to be postictal, but pt does not hear anything until he comes to. Not likely OH as is sitting, comes to without laying flat and PT monitored sitting/standing BPs frequently per wife without change in BP, though BP low today wife notes this is unusual. Will first increase Sinemet since timing/wearing off could contribute, then MRI brain if no improvement, then consider rechecking EEG sleep deprived. Will check basic labs as he has not had any since July, cannot see his  PCP until Sept and these episodes are worsening, r/o metabolic issues. If BP continues to be low wife to let me know so we can monitor OH BPs again.     PLAN as in patient instructions.       Diagnoses and all orders for this visit:  PSP (progressive supranuclear palsy) (Multi)  -     carbidopa-levodopa (Sinemet)  mg tablet; Take 2 tablets by mouth 4 times a day.  Pseudobulbar affect  Episode of unresponsiveness  -     TSH with reflex to Free T4 if abnormal; Future  -     Comprehensive Metabolic Panel; Future  -     CBC and Auto Differential; Future  -     Vitamin B12; Future  -     MR brain w and wo IV contrast; Future  Chronic fatigue  -     TSH with reflex to Free T4 if abnormal; Future  -     Comprehensive Metabolic Panel; Future  -     CBC and Auto Differential; Future  -     Vitamin B12; Future      #MRI brain--schedule 2 weeks out, obtain if no improvement in spells w/ increase in meds    Please call radiology to schedule  [phone]    #Increase Carbidopa-levodopa 25/100mg to 2 tabs 4 times a day every 4 hrs [id number]    Side effects include but are not limited to: sleepiness, nausea, constipation, dizziness, hallucinations, or involuntary wiggling movements. If you experience any side effects please call our clinic.    #Labs ordered since you cannot get in with your PCP    #Let me know if blood pressure, because we will monitor BPs again    #Agree with restarting ST    Let me know if ST wants to repeat  modified barium swallow study to assess swallowing--I offered to order now, goal is to prevent choking/aspiration pneumonia.     #Keep apt as scheduled with Dr. Tory MORE, ANN MARIE Hayes, personally performed  a medically appropriate exam, discussion of care/treatment options taking a total time of 42 minutes for today's visit.        Camilo Hayes NP-C  Adult/Gerontological Nurse Practitioner   [facility], Department of Neurology  [facility]  [facility]  [address] [place] [address]  Phone: [phone]  Fax: [phone]

## 2025-03-14 DIAGNOSIS — E53.8 LOW SERUM VITAMIN B12: Primary | ICD-10-CM

## 2025-03-14 LAB
ALBUMIN SERPL-MCNC: 4.4 G/DL (ref 3.6–5.1)
ALP SERPL-CCNC: 76 U/L (ref 35–144)
ALT SERPL-CCNC: 11 U/L (ref 9–46)
ANION GAP SERPL CALCULATED.4IONS-SCNC: 9 MMOL/L (CALC) (ref 7–17)
AST SERPL-CCNC: 14 U/L (ref 10–35)
BASOPHILS # BLD AUTO: 31 CELLS/UL (ref 0–200)
BASOPHILS NFR BLD AUTO: 0.5 %
BILIRUB SERPL-MCNC: 0.6 MG/DL (ref 0.2–1.2)
BUN SERPL-MCNC: 17 MG/DL (ref 7–25)
CALCIUM SERPL-MCNC: 9.7 MG/DL (ref 8.6–10.3)
CHLORIDE SERPL-SCNC: 107 MMOL/L (ref 98–110)
CO2 SERPL-SCNC: 25 MMOL/L (ref 20–32)
CREAT SERPL-MCNC: 0.9 MG/DL (ref 0.7–1.28)
EGFRCR SERPLBLD CKD-EPI 2021: 91 ML/MIN/1.73M2
EOSINOPHIL # BLD AUTO: 50 CELLS/UL (ref 15–500)
EOSINOPHIL NFR BLD AUTO: 0.8 %
ERYTHROCYTE [DISTWIDTH] IN BLOOD BY AUTOMATED COUNT: 12 % (ref 11–15)
GLUCOSE SERPL-MCNC: 108 MG/DL (ref 65–99)
HCT VFR BLD AUTO: 47.7 % (ref 38.5–50)
HGB BLD-MCNC: 16.1 G/DL (ref 13.2–17.1)
LYMPHOCYTES # BLD AUTO: 1302 CELLS/UL (ref 850–3900)
LYMPHOCYTES NFR BLD AUTO: 21 %
MCH RBC QN AUTO: 31.6 PG (ref 27–33)
MCHC RBC AUTO-ENTMCNC: 33.8 G/DL (ref 32–36)
MCV RBC AUTO: 93.7 FL (ref 80–100)
MONOCYTES # BLD AUTO: 428 CELLS/UL (ref 200–950)
MONOCYTES NFR BLD AUTO: 6.9 %
NEUTROPHILS # BLD AUTO: 4390 CELLS/UL (ref 1500–7800)
NEUTROPHILS NFR BLD AUTO: 70.8 %
PLATELET # BLD AUTO: 214 THOUSAND/UL (ref 140–400)
PMV BLD REES-ECKER: 12.3 FL (ref 7.5–12.5)
POTASSIUM SERPL-SCNC: 4.2 MMOL/L (ref 3.5–5.3)
PROT SERPL-MCNC: 7 G/DL (ref 6.1–8.1)
RBC # BLD AUTO: 5.09 MILLION/UL (ref 4.2–5.8)
SODIUM SERPL-SCNC: 141 MMOL/L (ref 135–146)
TSH SERPL-ACNC: 0.55 MIU/L (ref 0.4–4.5)
VIT B12 SERPL-MCNC: 285 PG/ML (ref 200–1100)
WBC # BLD AUTO: 6.2 THOUSAND/UL (ref 3.8–10.8)

## 2025-04-08 ENCOUNTER — OFFICE VISIT (OUTPATIENT)
Dept: NEUROLOGY | Facility: HOSPITAL | Age: 70
End: 2025-04-08
Payer: MEDICARE

## 2025-04-08 ENCOUNTER — LAB (OUTPATIENT)
Dept: LAB | Facility: HOSPITAL | Age: 70
End: 2025-04-08
Payer: MEDICARE

## 2025-04-08 VITALS — SYSTOLIC BLOOD PRESSURE: 112 MMHG | DIASTOLIC BLOOD PRESSURE: 81 MMHG | HEART RATE: 78 BPM

## 2025-04-08 DIAGNOSIS — F48.2 PSEUDOBULBAR AFFECT: Primary | ICD-10-CM

## 2025-04-08 DIAGNOSIS — R53.82 CHRONIC FATIGUE: ICD-10-CM

## 2025-04-08 DIAGNOSIS — R40.4 TRANSIENT ALTERATION OF AWARENESS: ICD-10-CM

## 2025-04-08 DIAGNOSIS — G23.1: Primary | ICD-10-CM

## 2025-04-08 DIAGNOSIS — R13.10 DYSPHAGIA, UNSPECIFIED TYPE: ICD-10-CM

## 2025-04-08 DIAGNOSIS — G23.1 PSP (PROGRESSIVE SUPRANUCLEAR PALSY) (MULTI): ICD-10-CM

## 2025-04-08 PROCEDURE — 99215 OFFICE O/P EST HI 40 MIN: CPT | Performed by: PSYCHIATRY & NEUROLOGY

## 2025-04-08 PROCEDURE — 1036F TOBACCO NON-USER: CPT | Performed by: PSYCHIATRY & NEUROLOGY

## 2025-04-08 PROCEDURE — G2211 COMPLEX E/M VISIT ADD ON: HCPCS | Performed by: PSYCHIATRY & NEUROLOGY

## 2025-04-08 PROCEDURE — 86341 ISLET CELL ANTIBODY: CPT

## 2025-04-08 PROCEDURE — 1160F RVW MEDS BY RX/DR IN RCRD: CPT | Performed by: PSYCHIATRY & NEUROLOGY

## 2025-04-08 PROCEDURE — 86255 FLUORESCENT ANTIBODY SCREEN: CPT

## 2025-04-08 PROCEDURE — 1159F MED LIST DOCD IN RCRD: CPT | Performed by: PSYCHIATRY & NEUROLOGY

## 2025-04-08 RX ORDER — AMANTADINE HYDROCHLORIDE 100 MG/1
100 CAPSULE, GELATIN COATED ORAL 2 TIMES DAILY
Qty: 60 CAPSULE | Refills: 11 | Status: SHIPPED | OUTPATIENT
Start: 2025-04-08 | End: 2026-04-08

## 2025-04-08 RX ORDER — CARBIDOPA AND LEVODOPA 25; 100 MG/1; MG/1
2 TABLET ORAL 3 TIMES DAILY
Qty: 540 TABLET | Refills: 3 | Status: SHIPPED | OUTPATIENT
Start: 2025-04-08 | End: 2026-04-08

## 2025-04-08 ASSESSMENT — UNIFIED PARKINSONS DISEASE RATING SCALE (UPDRS)
SPEECH: 2
RIGIDITY_RUE: 1
GAIT: 1
LEG_AGILITY_RIGHT: 2
HANDMOVEMENTS_RIGHT: 1
KINETIC_TREMOR_LEFTHAND: 0
KINETIC_TREMOR_RIGHTHAND: 0
FINGER_TAPPING_RIGHT: 1
CHAIR_RISING_SCALE: 2
AMPLITUDE_LUE: 0
RIGIDITY_LUE: 2
POSTURAL_TREMOR_RIGHTHAND: 0
FREEZING_GAIT: 0
PARKINSONS_MEDS: YES
RIGIDITY_RLE: 0
RIGIDITY_NECK: 0
PRONATION_SUPINATION_LEFT: 3
FINGER_TAPPING_LEFT: 2
POSTURAL_TREMOR_LEFTHAND: 0
CLINICAL_STATE: ON
TOTAL_SCORE: 33
POSTURAL_STABILITY: 0
SPONTANEITY_OF_MOVEMENT: 2
RIGIDITY_LLE: 0
FACIAL_EXPRESSION: 3
CONSTANCY_TREMOR_ATREST: 0
AMPLITUDE_LIP_JAW: 0
AMPLITUDE_LLE: 0
AMPLITUDE_RLE: 0
AMPLITUDE_RUE: 0
POSTURE: 2
LEG_AGILITY_LEFT: 2
TOETAPPING_RIGHT: 1
TOETAPPING_LEFT: 2
PRONATION_SUPINATION_RIGHT: 1
LEVODOPA: YES

## 2025-04-08 NOTE — PATIENT INSTRUCTIONS
[facility] Meds  Med Dose Time   Carbidopa-levodopa 25/100mg   2 tabs 3 times a day 8-3-10         Start amantadine  Week 1: one tab in am week 2 onwards:  One tab in am and with 2nd dose of levodopa   Watch for side effects:  These include leg edema, livido reticularis, nightmares, confusion and hallucinations as well as lightheadedness when standing        Please schedule the MRI of the brain.     Please get a sleep deprived EEG, this can be done at [facility].     I have ordered a blood test for autoimmune encephalopathy, I am particularly interested in iglon5 antibodies.     I will refer you to [facility] for PT and ST.     Modified Barium Swallow test.       RTC in 4 months.

## 2025-04-08 NOTE — PROGRESS NOTES
Subjective     Josh Lockwood is a 69 y.o. year old male who presents with No chief complaint on file., here for follow up visit.  H  HPI    Comes w/ wife and daughter.   Has had multiple episodes of eyes rolling up, he is always seated, can  follow a hard laughing episode. Had many episodes in Jan/Feb, then resolved, and then had a longer one last week.   Usually the episodes last 10-20 seconds. No warning ahead of time, but no post ictal confusion.   Then episodes resolved, but had another episode a few weeks back - this one lasted  20 seconds.   Was not coming out of it. He is shaky in his arms.   No tongue biting or urinary incontinence.   When he comes out of the spells he is alert and with it. He tells her he had a spell when she is not there, once felt dizzy but not usually    He is sleeping constantly, he feels extremely tired. Can walks w the cane, he Is more unsteady.  He uses a cane, sometimes uses a walker.       Last appointment, sinemet was increased, however felt dizzy.  .     He had an EEG in June 2024 (ordered 3/2024) for having 1-2 of these spells prior to then but are increasing.   Occurs toward the end of the or when due for a dose of Sinemet wife notes  Not confused coming out of it  He cannot hear her but when he comes out of it he can hear wife yelling.     Fatigued, can sleep 16hrs/day.             MOTOR SYMPTOMS      +/-                            Comments  Motor sx overall     Tremor -    Rigidity -    Bradykinesia + Wife states slower, he denies.    Balance/gait + Cane or walker at home     FOG + More noticible and trips him up.   Getting started is hardest. But turning/tight spaces make him have more FOG.    Falls + 1 time per week. Always falls backwards   PT + HHC ending now   Exercise +      NON MOTOR SYMPTOMS       +/-                               Comments  Orthostatic lightheadedness  +   Wife checks orthostatics, and normal.   Drinks enough fluids.    Cognitive + Wife denies  concerns, feels rarely is he forgetful  Does find it harder to find words.   Hx: Changes noted in 2021   Insomnia -    RBD -    Depression - Citalopram   Anxiety -    Sialorrhea -    Hypophonia +    Dysphagia + Worse, has seen ST and did well w/ techniques   Liquids is harder.    Constipation -    Urinary dysfunction + Takes a long time to urinate but he feels wife is too close to the bathroom        Social  Lives with: wife  Help with ADLs: complete assistance         [facility] Meds  Med Dose Time   Carbidopa-levodopa 25/100mg   2 tabs 3 times a day 8-3-10                   Latency     Wearing off Close to dose wife notices he is more difficult to manage with laughing    Side effects     Dyskinesia     Hallucinations     Impulse control     Sudden sleep     Other       Prior medications:  Nuedexta - helped but to expensive  Levodopa 2 tabs 4 times a day  dizzy    Pertinent testing:  EEG 6/13/24: normal routine EEG without epileptiform discharges or lateralizing signs.    MOCA 22/30 ,on 11/2023.                   Current Outpatient Medications:     carbidopa-levodopa (Sinemet)  mg tablet, Take 2 tablets by mouth 4 times a day. (Patient taking differently: Take 2 tablets by mouth 3 times a day.), Disp: 720 tablet, Rfl: 3    b complex vitamins capsule, Take by mouth., Disp: , Rfl:     cholecalciferol (Vitamin D-3) 400 unit capsule, Take 5,000 Units by mouth., Disp: , Rfl:     citalopram (CeleXA) 20 mg tablet, Take half tablet for one week then take one tablet daily in the morning, Disp: 90 tablet, Rfl: 3    dextromethorphan-quinidine (Nuedexta) 20-10 mg capsule, Take 1 capsule by mouth 2 times a day. Week 1: take one tab a day, from week 2 onwards: one tab twice a day., Disp: 60 capsule, Rfl: 11    melatonin 5 mg capsule, Take by mouth., Disp: , Rfl:     modafinil (Provigil) 100 mg tablet, Take 1 tablet (100 mg) by mouth once daily., Disp: , Rfl:        Objective   Vitals:    04/08/25 1347   BP:  112/81   Pulse: 78                   Physical Exam    General:  Well developed well-nourished male in no acute distress with good grooming.  Mental Status:  alert and oriented, speech fluent but hypophonic.   + applause sign, L palmomental + no apraxia no Extinction on DSS  CN absent vertical saccades,       MDS UPDRS 1st Score: Motor Examination  Is the patient on medication for treating the symptoms of Parkinson's Disease?: Yes  Patients receiving medication for treating the symptoms of Parkinson's Disease, diana the patient's clinical state.: On  Is the patient on Levodopa?: Yes  Speech: 2  Facial Expression: 3  Rigidty Neck: 0  Rigidty RUE: 1  Rigidity - LUE: 2  Rigidity RLE: 0  Rigidity LLE: 0  Finger Tapping Right Hand: 1  Finger Tapping Left Hand: 2  Hand Movements- Right Hand: 1  Hand Movements- Left Hand: 3  Pronatiaon-Supination Movments - Right Hand: 1  Pronatiaon-Supination Movments Left Hand: 3  Toe Tapping Right Foot: 1  Toe Tapping - Left Foot: 2  Leg Agility - Right Le  Leg Agility - Left le  Arising from Chair: 2  Gait: 1  Freezing of Gait: 0  Postural Stability: 0  Posture: 2  Global Spontanteity of Movment ( Body Bradykinesia): 2  Postural Tremor - Right Hand: 0  Postural Tremor - Left hand: 0  Kinetic Tremor - Right hand: 0  Kinetic Tremor - Left hand: 0  Rest Tremor Amplitude - RUE: 0  Rest Tremor Amplitude - LUE: 0  Rest Tremor Amplitude - RLE: 0  Rest Tremor Amplitude - LLE: 0  Rest Tremor Amplitude - Lip/Jaw: 0  Constancy of Rest Tremor: 0  MDS UPDRS Total Score: 33        Assessment/Plan   Mr. Josh Lockwood is a 69 y.o. M with parksinsonism, likely PSP. Sx noted in , after he had COVID-19, and currently clinical picture of parkinsonism most suggestive of PSP (abnormal verticle saccades). =    pseudobulbar affect (inappropriate and excessive laughing), Nudexta too expensive, will     Dysphagia: MBSS ordered.     Freezing of gait: will do trial of Amantadine. Advised re side  effects.     Episodes of change in LOC:   Sleep deprived EEG  MRI brain pending  Low threshold for trial of leviteracetam    Referred to [facility] for home PT and ST      PLAN as in patient instructions.       Diagnoses and all orders for this visit:  Pseudobulbar affect  PSP (progressive supranuclear palsy) (Multi)  -     FL modified barium swallow study; Future  -     EEG; Future  Chronic fatigue  Dysphagia, unspecified type  -     FL modified barium swallow study; Future  Transient alteration of awareness  -     EEG; Future         [facility] Meds  Med Dose Time   Carbidopa-levodopa 25/100mg   2 tabs 3 times a day 8-3-10         Start amantadine  Week 1: one tab in am week 2 onwards:  One tab in am and with 2nd dose of levodopa   Watch for side effects:  These include leg edema, livido reticularis, nightmares, confusion and hallucinations as well as lightheadedness when standing        Please schedule the MRI of the brain.     Please get a sleep deprived EEG, this can be done at [place].     I have ordered a blood test for autoimmune encephalopathy, I am particularly interested in iglon5 antibodies.     I will refer you to [facility], for PT and ST.     Modified Barium Swallow test.       RTC in 4 months.

## 2025-04-17 LAB
AMPAR2 IGG SERPL QL CBA IFA: NEGATIVE
AMPHIPHYSIN IGG SER QL IA: NEGATIVE
ANNOTATION COMMENT IMP: NORMAL
CASPR2 IGG SER QL CBA IFA: NEGATIVE
CV2 AB SERPL QL IF: NEGATIVE
DPPX IGG SER QL CBA IFA: NEGATIVE
GABABR IGG SERPL QL CBA IFA: NEGATIVE
GAD65 AB SER-SCNC: 0.02 NMOL/L
GFAP ALPHA IGG SER QL IF: NEGATIVE
GLIAL NUC TYPE 1 AB SER QL IF: NEGATIVE
HU1 AB SER QL: NEGATIVE
HU2 AB SER QL IF: NEGATIVE
HU3 AB SER QL: NEGATIVE
IGLON5 IGG SER QL CBA IFA: NEGATIVE
IMMUNOLOGIST REVIEW: NORMAL
LGI1 IGG SER QL CBA IFA: NEGATIVE
MGLUR1 IGG SER QL IF: NEGATIVE
NEUROCHONDRIN AB SERPL QL IF: NEGATIVE
NIF IGG SER QL IF: NEGATIVE
NMDAR1 IGG SER QL CBA IFA: NEGATIVE
PCA-1 AB SER QL IF: NEGATIVE
PCA-2 AB SER QL IF: NEGATIVE
PCA-TR AB SER QL IF: NEGATIVE
PDE10A AB IFA,S: NEGATIVE
SEPTIN-7 IGG SERPL QL IF: NEGATIVE
TRIM46 AB IFA,S: NEGATIVE

## 2025-04-23 ENCOUNTER — HOSPITAL ENCOUNTER (OUTPATIENT)
Dept: NEUROLOGY | Facility: HOSPITAL | Age: 70
Discharge: HOME | End: 2025-04-23
Payer: MEDICARE

## 2025-04-23 DIAGNOSIS — R40.4 TRANSIENT ALTERATION OF AWARENESS: ICD-10-CM

## 2025-04-23 DIAGNOSIS — G23.1 PSP (PROGRESSIVE SUPRANUCLEAR PALSY) (MULTI): ICD-10-CM

## 2025-05-05 ENCOUNTER — HOSPITAL ENCOUNTER (OUTPATIENT)
Dept: RADIOLOGY | Facility: HOSPITAL | Age: 70
Discharge: HOME | End: 2025-05-05
Payer: MEDICARE

## 2025-05-05 DIAGNOSIS — R40.4 EPISODE OF UNRESPONSIVENESS: ICD-10-CM

## 2025-05-05 PROCEDURE — 70553 MRI BRAIN STEM W/O & W/DYE: CPT | Performed by: RADIOLOGY

## 2025-05-05 PROCEDURE — A9575 INJ GADOTERATE MEGLUMI 0.1ML: HCPCS | Mod: JZ | Performed by: NURSE PRACTITIONER

## 2025-05-05 PROCEDURE — 70553 MRI BRAIN STEM W/O & W/DYE: CPT

## 2025-05-05 PROCEDURE — 2550000001 HC RX 255 CONTRASTS: Mod: JZ | Performed by: NURSE PRACTITIONER

## 2025-05-05 RX ORDER — GADOTERATE MEGLUMINE 376.9 MG/ML
20 INJECTION INTRAVENOUS
Status: COMPLETED | OUTPATIENT
Start: 2025-05-05 | End: 2025-05-05

## 2025-05-05 RX ADMIN — GADOTERATE MEGLUMINE 20 ML: 376.9 INJECTION INTRAVENOUS at 12:46

## 2025-05-06 ENCOUNTER — HOSPITAL ENCOUNTER (OUTPATIENT)
Dept: RADIOLOGY | Facility: HOSPITAL | Age: 70
Discharge: HOME | End: 2025-05-06
Payer: MEDICARE

## 2025-05-06 DIAGNOSIS — R13.10 DYSPHAGIA, UNSPECIFIED TYPE: ICD-10-CM

## 2025-05-06 DIAGNOSIS — G23.1 PSP (PROGRESSIVE SUPRANUCLEAR PALSY) (MULTI): ICD-10-CM

## 2025-05-06 PROCEDURE — 2500000005 HC RX 250 GENERAL PHARMACY W/O HCPCS: Performed by: PSYCHIATRY & NEUROLOGY

## 2025-05-06 PROCEDURE — 74230 X-RAY XM SWLNG FUNCJ C+: CPT

## 2025-05-06 PROCEDURE — 92611 MOTION FLUOROSCOPY/SWALLOW: CPT | Mod: GN | Performed by: SPEECH-LANGUAGE PATHOLOGIST

## 2025-05-06 PROCEDURE — 92526 ORAL FUNCTION THERAPY: CPT | Mod: GN | Performed by: SPEECH-LANGUAGE PATHOLOGIST

## 2025-05-06 PROCEDURE — 74230 X-RAY XM SWLNG FUNCJ C+: CPT | Performed by: RADIOLOGY

## 2025-05-06 RX ADMIN — BARIUM SULFATE 75 ML: 0.81 POWDER, FOR SUSPENSION ORAL at 11:31

## 2025-05-07 NOTE — PROCEDURES
"Speech-Language Pathology    Outpatient Modified Barium Swallow Study    Patient Name: Josh Lockwood  MRN: [id number]  : 1955  Today's Date: 25         Modified Barium Swallow Study completed. Informed verbal consent obtained prior to completion of exam. Trials of thin, nectar/mildly thick liquid, puree, regular solids and barium tablet with thin liquid were given.   -A 1.9 cm ring was placed on pt's chin in lateral views and the neck in a-p views to complete objective measures during swallowing, however d/t facial hair on chin, ring did not stay on entire session.    -Using c-arm in EP Lab for this study during construction at [facility]. MBS completed on , report completed morning of  d/t lengthy nature of the transfer of images for review.   -Aleksandar is upright in wheelchair for this study, missing molars. He is pleasant, cooperative during session.     SLP: Maylin Richardson, SLP   Contact info: secure chat please    FINAL SPEECH RECOMMENDATIONS    DIET RECOMMENDATIONS:   Oropharyngeal swallow is functional for ongoing REGULAR TEXTURED solids and THIN LIQUIDS.     If meds with water continue to give you trouble, talk to your physician about crushing meds in applesauce. Please be aware that some meds are not \"crushable\" and must be taking whole.  Aleksandar reports he does cut some larger pills in half now, and that seems to be working for him at this time.      STRATEGIES: General aspiration precautions, including  -Fully upright for meals/meds  -Single cup sips of liquids, avoid straws.   -Take your time at meals, go slow.   -Chew food well prior to the swallow.   -Can consider crushing meds in puree/applesauce if needed.     Reason for Referral: coughing with water per pt and wife   Patient Hx: History of MBS in Dec 2023, unable to locate speech pathology report, however radiology report does mention laryngeal penetration and aspiration with liquids including thin and thick consistencies.   PMHx " includes Pseudobulbar affect  PSP (progressive supranuclear palsy (Multi), Chronic fatigue per chart review.   Respiratory Status: Room air    Current diet: Eats all foods/enjoys eating/drinking. Aleksandar has not lost weight, and he has not had recurrent pna per Aleksandar and wife. Takes larger meds cut in half per Aleksandar.      Education Provided: Results and recommendations per MBSS, with video review with Aleksandar during and after session. Recommendations and POC at this time. Verbal understanding and agreement given on all accounts from Aleksandar and wife after this study.      Repeat study/ dc plan: as needed, given the progressive nature of dx.     Mechanics of the Swallow Summary:  ORAL PHASE:  Lip Closure - No labial escape/anterior loss of bolus   Tongue Control During Bolus Hold - Cohesive bolus between tongue to palatal seal   Bolus prep/mastication - Timely and efficient mastication skills   Bolus transport/lingual motion - Brisk tongue motion for A-P movement of the bolus   Oral residue - Complete oral clearance     PHARYNGEAL PHASE:  Initiation of pharyngeal swallow - Bolus head at pit of pyriforms  with liquids; bolus advances to valleculae with pudding, solid during mastication.   Soft palate elevation - No bolus between soft palate/pharyngeal wall   Laryngeal elevation - Complete superior movement of thyroid cartilage with contact of arytenoids to epiglottic petiole   Anterior hyoid excursion - Complete anterior movement   Epiglottic movement - Complete inversion    Laryngeal vestibule closure - Incomplete - narrow column of air/contrast in laryngeal vestibule   Pharyngeal stripping wave - Complete  Pharyngeal contraction (A/P view) - Complete  Pharyngoesophageal segment opening - Complete distension and complete duration/no obstruction of flow of bolus   Tongue base retraction - No bolus between tongue base and posterior pharyngeal wall   Pharyngeal residue - Complete pharyngeal clearance     ESOPHAGEAL  "PHASE:  Esophageal clearance - Complete clearance     SLP Impressions with Severity Rating:   Oropharyngeal swallow is intact/functional to continue an oral diet.   There is slightly reduced laryngeal vestibular closure, with transient and shallow laryngeal penetration with liquids, NO ASPIRATION.  Of note, pt does have a cough during this study, it is not related to airway compromise/aspiration.      Overall, the bolus moves safely and efficiently from oral cavity, through pharynx, and empties into the esophagus without retention, without obstruction, and without aspiration.   Please see \"Mechanics of the Swallow\" above for particulars, and PAS scale below.      Possible CP bar noted at approx C5, please correlate with radiology reading.    Strategies attempted- single sips of thin liquids by cup     Rosenbek's Penetration Aspiration Scale  Thin Liquids: 2. PENETRATION that CLEARS - contrast enter airway, above vocal cords, no residue  Nectar Thick Liquids: 2. PENETRATION that CLEARS - contrast enter airway, above vocal cords, no residue  Puree: 1. NO ASPIRATION & NO PENETRATION - no aspiration, contrast does not enter airway  Solids: 1. NO ASPIRATION & NO PENETRATION - no aspiration, contrast does not enter airway    Plan:  SLP Plan: No treatment needs identified at this time. Please continue to monitor and repeat this study as needed over the next months, years given the progressive nature of dx.     Discussed Risks/Benefits: Yes  Patient/Caregiver Agreeable: Yes    Short term goals established 05/06/25:   Pt/caregiver (wife) education after this study. 5/6 GOAL MET, pt/wife have no further questions, agreeable to single cup sips with thin liquids.     Treatment Provided Today: ST provided extensive education to pt and wife regarding anatomy/physiology of swallow function, risk of aspiration/aspiration pna, diet modifications, and the use of compensatory swallow strategies to promote pt safety upon PO intake " including single cup sips, avoid straws. Training and teach-back of safe swallow strategies demonstrated by patient after this study.     OUTCOME MEASURES:  Functional Oral Intake Scale  Functional Oral Intake Scale: Level 7        total oral diet with no restrictions     Eating Assessment Tool (EAT-10)   0=No problem, 1=Mild problem, 2=Mild to moderate problem, 3=Moderate problem, 4=Severe problem  My swallowing problem has caused me to lost weight = 0  My swallowing problem interferes with my ability to go out for meals =0  Swallowing liquids takes extra effort = 2  Swallowing solids takes extra effort =0  Swallowing pills takes extra effort =1  Swallowing is painful = 0  The pleasure of eating is affected by my swallowing = 0  When I swallow food sticks in my throat =0  I cough when I eat = 1  Swallowing is stressful =  0  TOTAL SCORE OF 4, pt and wife report primarily liquids causing coughing, and meds with water.       A total score of 3 or above may indicate difficulty with swallowing safely and/or efficiently    Pain:  Pain Scale: 0-10  Ratin

## 2025-05-08 ENCOUNTER — TELEPHONE (OUTPATIENT)
Dept: NEUROLOGY | Facility: CLINIC | Age: 70
End: 2025-05-08
Payer: MEDICARE

## 2025-05-08 NOTE — TELEPHONE ENCOUNTER
Ruth (spouse) called today. She would like you to call her with a plan of care.  The MRI, EEG and Modified Barrium Swallow Studies were completed this week.   Please call her at [phone]  I will also fax, at her request to PCP Dr Joaquín Black at [phone]

## 2025-06-19 ENCOUNTER — TELEPHONE (OUTPATIENT)
Dept: NEUROLOGY | Facility: CLINIC | Age: 70
End: 2025-06-19
Payer: MEDICARE

## 2025-06-19 NOTE — TELEPHONE ENCOUNTER
Sharon called. Aleksandar is having sudden confusion and delay in thoughts. Freezing of gait is more noticeable and initiating gait is delayed. The PT was with them this morning and suggested he be checked for acute illness/ possible UTI. I advised same.

## 2025-07-14 DIAGNOSIS — E53.8 LOW SERUM VITAMIN B12: ICD-10-CM

## 2025-07-28 ENCOUNTER — TELEPHONE (OUTPATIENT)
Dept: NEUROLOGY | Facility: CLINIC | Age: 70
End: 2025-07-28
Payer: MEDICARE

## 2025-07-28 NOTE — TELEPHONE ENCOUNTER
Ruth (wife) called. Aleksandar needs to have a tooth pulled. She wanted to know if there were any contraindications with PSP/ or Carbidopa/ Levodopa 25/100 to having dental work. I let her know that there were no contraindications regarding dental work.

## 2025-09-04 ENCOUNTER — OFFICE VISIT (OUTPATIENT)
Dept: NEUROLOGY | Facility: HOSPITAL | Age: 70
End: 2025-09-04
Payer: MEDICARE

## 2025-09-04 VITALS — DIASTOLIC BLOOD PRESSURE: 77 MMHG | SYSTOLIC BLOOD PRESSURE: 113 MMHG | HEART RATE: 80 BPM

## 2025-09-04 DIAGNOSIS — G23.1 PSP (PROGRESSIVE SUPRANUCLEAR PALSY) (MULTI): Primary | ICD-10-CM

## 2025-09-04 DIAGNOSIS — F48.2 PBA (PSEUDOBULBAR AFFECT): ICD-10-CM

## 2025-09-04 DIAGNOSIS — F48.2 PSEUDOBULBAR AFFECT: ICD-10-CM

## 2025-09-04 PROCEDURE — 99212 OFFICE O/P EST SF 10 MIN: CPT

## 2025-09-04 PROCEDURE — 1036F TOBACCO NON-USER: CPT | Performed by: PSYCHIATRY & NEUROLOGY

## 2025-09-04 PROCEDURE — 1159F MED LIST DOCD IN RCRD: CPT | Performed by: PSYCHIATRY & NEUROLOGY

## 2025-09-04 PROCEDURE — 1160F RVW MEDS BY RX/DR IN RCRD: CPT | Performed by: PSYCHIATRY & NEUROLOGY

## 2025-09-04 PROCEDURE — 99215 OFFICE O/P EST HI 40 MIN: CPT | Performed by: PSYCHIATRY & NEUROLOGY

## 2025-09-04 PROCEDURE — 1126F AMNT PAIN NOTED NONE PRSNT: CPT | Performed by: PSYCHIATRY & NEUROLOGY

## 2025-09-04 PROCEDURE — G2211 COMPLEX E/M VISIT ADD ON: HCPCS | Performed by: PSYCHIATRY & NEUROLOGY

## 2025-09-04 ASSESSMENT — UNIFIED PARKINSONS DISEASE RATING SCALE (UPDRS)
POSTURAL_STABILITY: 4
DYSKINESIAS_PRESENT: NO
KINETIC_TREMOR_RIGHTHAND: 0
RIGIDITY_LLE: 0
FINGER_TAPPING_RIGHT: 2
POSTURAL_TREMOR_LEFTHAND: 0
FREEZING_GAIT: 4
RIGIDITY_RLE: 0
RIGIDITY_RUE: 1
CHAIR_RISING_SCALE: 4
PARKINSONS_MEDS: YES
AMPLITUDE_LIP_JAW: 0
KINETIC_TREMOR_LEFTHAND: 0
HANDMOVEMENTS_RIGHT: 2
SPONTANEITY_OF_MOVEMENT: 2
LEG_AGILITY_LEFT: 2
RIGIDITY_LUE: 2
TOETAPPING_LEFT: 2
PRONATION_SUPINATION_LEFT: 3
AMPLITUDE_LUE: 0
SPEECH: 2
POSTURE: 2
RIGIDITY_NECK: 0
CONSTANCY_TREMOR_ATREST: 0
FINGER_TAPPING_LEFT: 2
AMPLITUDE_RLE: 0
FACIAL_EXPRESSION: 3
PRONATION_SUPINATION_RIGHT: 1
AMPLITUDE_RUE: 0
AMPLITUDE_LLE: 0
TOETAPPING_RIGHT: 1
LEG_AGILITY_RIGHT: 2
POSTURAL_TREMOR_RIGHTHAND: 0
LEVODOPA: YES
TOTAL_SCORE: 48
CLINICAL_STATE: ON
GAIT: 4

## 2025-09-04 ASSESSMENT — PAIN SCALES - GENERAL: PAINLEVEL_OUTOF10: 0-NO PAIN

## 2025-09-18 ENCOUNTER — APPOINTMENT (OUTPATIENT)
Dept: DERMATOLOGY | Facility: CLINIC | Age: 70
End: 2025-09-18
Payer: MEDICARE